# Patient Record
Sex: MALE | Race: OTHER | NOT HISPANIC OR LATINO | URBAN - METROPOLITAN AREA
[De-identification: names, ages, dates, MRNs, and addresses within clinical notes are randomized per-mention and may not be internally consistent; named-entity substitution may affect disease eponyms.]

---

## 2021-08-30 ENCOUNTER — EMERGENCY (EMERGENCY)
Facility: HOSPITAL | Age: 26
LOS: 1 days | Discharge: HOME | End: 2021-08-30
Attending: EMERGENCY MEDICINE
Payer: SELF-PAY

## 2021-08-30 VITALS
OXYGEN SATURATION: 98 % | TEMPERATURE: 97 F | WEIGHT: 160.06 LBS | RESPIRATION RATE: 16 BRPM | HEART RATE: 86 BPM | DIASTOLIC BLOOD PRESSURE: 80 MMHG | SYSTOLIC BLOOD PRESSURE: 118 MMHG

## 2021-08-30 LAB
APPEARANCE UR: CLEAR — SIGNIFICANT CHANGE UP
BILIRUB UR-MCNC: NEGATIVE — SIGNIFICANT CHANGE UP
COLOR SPEC: YELLOW — SIGNIFICANT CHANGE UP
DIFF PNL FLD: NEGATIVE — SIGNIFICANT CHANGE UP
GLUCOSE UR QL: NEGATIVE — SIGNIFICANT CHANGE UP
KETONES UR-MCNC: ABNORMAL
LEUKOCYTE ESTERASE UR-ACNC: ABNORMAL
NITRITE UR-MCNC: NEGATIVE — SIGNIFICANT CHANGE UP
PH UR: 6.5 — SIGNIFICANT CHANGE UP (ref 5–8)
PROT UR-MCNC: NEGATIVE — SIGNIFICANT CHANGE UP
SP GR SPEC: 1 — LOW (ref 1.01–1.02)
UROBILINOGEN FLD QL: NEGATIVE — SIGNIFICANT CHANGE UP

## 2021-08-30 RX ORDER — CEFUROXIME AXETIL 250 MG
500 TABLET ORAL ONCE
Refills: 0 | Status: COMPLETED | OUTPATIENT
Start: 2021-08-30 | End: 2021-08-30

## 2021-08-30 RX ORDER — KETOROLAC TROMETHAMINE 30 MG/ML
60 SYRINGE (ML) INJECTION ONCE
Refills: 0 | Status: DISCONTINUED | OUTPATIENT
Start: 2021-08-30 | End: 2021-08-30

## 2021-08-30 RX ADMIN — Medication 60 MILLIGRAM(S): at 22:00

## 2021-08-30 NOTE — ED PROVIDER NOTE - PROGRESS NOTE DETAILS
Pt feels much better after Toradol.  UA c/w UTI.  Indwelling urinary catheter has been successfully changed.  Prescribed cefuroxime.  Pt is visiting here from [place], [place] and his doctor is there.  Advised strict return precautions and PMD f/u.  -[facility] called to transport him back to [facility] where his electric wheelchair is waiting.  He says he will be fine from there.

## 2021-08-30 NOTE — ED PROVIDER NOTE - PATIENT PORTAL LINK FT
You can access the FollowMyHealth Patient Portal offered by [facility] by registering at the following website: [email or web address]. By joining [facility]’s FollowMyHealth portal, you will also be able to view your health information using other applications (apps) compatible with our system.

## 2021-08-30 NOTE — ED PROVIDER NOTE - NSFOLLOWUPINSTRUCTIONS_ED_ALL_ED_FT
CATHETER-ASSOCIATED URINARY TRACT INFECTION - AfterCare(R) Instructions(ER/ED)           Catheter-associated Urinary Tract Infection    WHAT YOU NEED TO KNOW:    A catheter-associated urinary tract infection (CAUTI) is an infection caused by an indwelling urinary catheter. An indwelling urinary catheter is a thin, flexible tube that is inserted into the bladder. It is left in place to drain urine. The infection may travel along the catheter and into the bladder or kidneys.     DISCHARGE INSTRUCTIONS:    Return to the emergency department if:   •You have severe pain in your lower back or abdomen.       •You have blood in your urine.       •You stop urinating, or you urinate much less than usual.       Contact your healthcare provider if:   •You have a fever.      •Your symptoms do not improve or get worse.       •You have questions or concerns about your condition or care.      Medicines: You may need any of the following:   •Antibiotics help treat an infection caused by bacteria.       •Antifungals help treat an infection caused by fungus.       •Acetaminophen decreases pain and fever. It is available without a doctor's order. Ask how much to take and how often to take it. Follow directions. Read the labels of all other medicines you are using to see if they also contain acetaminophen, or ask your doctor or pharmacist. Acetaminophen can cause liver damage if not taken correctly. Do not use more than 4 grams (4,000 milligrams) total of acetaminophen in one day.       •NSAIDs, such as ibuprofen, help decrease swelling, pain, and fever. This medicine is available with or without a doctor's order. NSAIDs can cause stomach bleeding or kidney problems in certain people. If you take blood thinner medicine, always ask your healthcare provider if NSAIDs are safe for you. Always read the medicine label and follow directions.      •Take your medicine as directed. Contact your healthcare provider if you think your medicine is not helping or if you have side effects. Tell him of her if you are allergic to any medicine. Keep a list of the medicines, vitamins, and herbs you take. Include the amounts, and when and why you take them. Bring the list or the pill bottles to follow-up visits. Carry your medicine list with you in case of an emergency.      Self-care:   •Drink fluids as directed. Fluids may help your kidneys and bladder get rid of the infection.       •Keep the catheter area clean. Clean your skin around the catheter as directed. Shower once a day. Do not take baths or go in hot tubs until your infection is gone.       •Do not have sex until your healthcare provider says it is okay. Sex may delay healing or cause another UTI.       Prevent another CAUTI:   •Wash your hands before and after you use the bathroom or touch the catheter. Wash your hands to prevent the spread of infection to your urinary tract.       •Clean all parts of your catheter as directed. Keep your catheter tubing clean. Do not place the catheter on the ground. Do not allow the drainage spout to touch the toilet. Use an alcohol swab to clean the end of drainage spout as directed.       •Keep the drainage bag below your waist. This may prevent urine from moving back into your bladder, which can cause an infection.       •Empty the urine bag as directed. This may prevent urine from flowing back into your bladder.       •Women should wipe front to back after a bowel movement. This may prevent germs from getting into the urinary tract.       •Keep the catheter secured to your leg as directed. Use tape or a special catheter baron to prevent your catheter from being pulled. This may also prevent kinks that could cause the urine to move back into the bladder.       Follow up with your healthcare provider as directed: Write down your questions so you remember to ask them during your visits.        © Copyright [employer] 2021           back to top                          © Copyright [employer] 2021

## 2021-08-30 NOTE — ED PROVIDER NOTE - OBJECTIVE STATEMENT
27 y/o man, h/o T9 paraplegia, wheelchair bound, self-catheterizes for urine chronically, and was flying into [facility] [employer] and shortly before landing began gradual onset lower back pain.  Denies any fall/injury.  He is paralyzed in lower extremities; has not developed any new weakness/numbness.  He placed an indwelling urinary catheter about 1 week ago.  No fever/chills/nausea/vomiting/abdominal pain/flank pain.  No recent antibiotic use.  Did not notice changes in urine output or color.

## 2021-08-30 NOTE — ED PROVIDER NOTE - CLINICAL SUMMARY MEDICAL DECISION MAKING FREE TEXT BOX
27 y/o man, h/o T9 paraplegia, wheelchair bound, self-catheterizes for urine chronically, and was flying into [facility] [employer] and shortly before landing began gradual onset lower back pain.  Denies any fall/injury--will check UA and UCx after changing urinary catheter, analgesia, reassess.  No indication for imaging at this time as Pt has no acute change in neuro deficits and no trauma.

## 2021-08-30 NOTE — ED ADULT NURSE NOTE - NSIMPLEMENTINTERV_GEN_ALL_ED
Implemented All Universal Safety Interventions:  [place] to call system. Call bell, personal items and telephone within reach. Instruct patient to call for assistance. Room bathroom lighting operational. Non-slip footwear when patient is off stretcher. Physically safe environment: no spills, clutter or unnecessary equipment. Stretcher in lowest position, wheels locked, appropriate side rails in place.
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
## 2021-08-31 VITALS
OXYGEN SATURATION: 100 % | TEMPERATURE: 98 F | RESPIRATION RATE: 18 BRPM | SYSTOLIC BLOOD PRESSURE: 122 MMHG | HEART RATE: 75 BPM | DIASTOLIC BLOOD PRESSURE: 73 MMHG

## 2021-08-31 RX ORDER — CEFUROXIME AXETIL 250 MG
1 TABLET ORAL
Qty: 14 | Refills: 0
Start: 2021-08-31 | End: 2021-09-06

## 2021-08-31 RX ADMIN — Medication 500 MILLIGRAM(S): at 01:27

## 2021-09-02 LAB
-  AMIKACIN: SIGNIFICANT CHANGE UP
-  AMPICILLIN: SIGNIFICANT CHANGE UP
-  AZTREONAM: SIGNIFICANT CHANGE UP
-  CEFEPIME: SIGNIFICANT CHANGE UP
-  CEFTAZIDIME: SIGNIFICANT CHANGE UP
-  CIPROFLOXACIN: SIGNIFICANT CHANGE UP
-  CIPROFLOXACIN: SIGNIFICANT CHANGE UP
-  GENTAMICIN: SIGNIFICANT CHANGE UP
-  IMIPENEM: SIGNIFICANT CHANGE UP
-  LEVOFLOXACIN: SIGNIFICANT CHANGE UP
-  LEVOFLOXACIN: SIGNIFICANT CHANGE UP
-  MEROPENEM: SIGNIFICANT CHANGE UP
-  NITROFURANTOIN: SIGNIFICANT CHANGE UP
-  PIPERACILLIN/TAZOBACTAM: SIGNIFICANT CHANGE UP
-  TETRACYCLINE: SIGNIFICANT CHANGE UP
-  TOBRAMYCIN: SIGNIFICANT CHANGE UP
-  VANCOMYCIN: SIGNIFICANT CHANGE UP
CULTURE RESULTS: SIGNIFICANT CHANGE UP
METHOD TYPE: SIGNIFICANT CHANGE UP
METHOD TYPE: SIGNIFICANT CHANGE UP
ORGANISM # SPEC MICROSCOPIC CNT: SIGNIFICANT CHANGE UP
SPECIMEN SOURCE: SIGNIFICANT CHANGE UP

## 2021-11-19 NOTE — ED ADULT NURSE NOTE - CAS DISCH TRANSFER METHOD
Health Maintenance Due   Topic Date Due   • DTaP/Tdap/Td Vaccine (7 - Td or Tdap) 10/19/2017       Patient is due for topics as listed above but is not proceeding with Immunization(s) Dtap/Tdap/Td at this time. Education provided for Immunization(s) Dtap/Tdap/Td.   Transportation service